# Patient Record
Sex: FEMALE | Race: BLACK OR AFRICAN AMERICAN | NOT HISPANIC OR LATINO | Employment: FULL TIME | URBAN - METROPOLITAN AREA
[De-identification: names, ages, dates, MRNs, and addresses within clinical notes are randomized per-mention and may not be internally consistent; named-entity substitution may affect disease eponyms.]

---

## 2017-05-15 ENCOUNTER — HOSPITAL ENCOUNTER (OUTPATIENT)
Dept: RADIOLOGY | Facility: HOSPITAL | Age: 37
Discharge: HOME | End: 2017-05-15
Attending: INTERNAL MEDICINE
Payer: COMMERCIAL

## 2017-05-15 DIAGNOSIS — M54.50 LOWER BACK PAIN: ICD-10-CM

## 2017-05-15 DIAGNOSIS — M54.50 LOWER BACK PAIN: Primary | ICD-10-CM

## 2017-05-15 PROCEDURE — 72120 X-RAY BEND ONLY L-S SPINE: CPT | Mod: 26,,, | Performed by: RADIOLOGY

## 2017-05-15 PROCEDURE — 72100 X-RAY EXAM L-S SPINE 2/3 VWS: CPT | Mod: TC

## 2017-05-15 PROCEDURE — 72100 X-RAY EXAM L-S SPINE 2/3 VWS: CPT | Mod: 26,,, | Performed by: RADIOLOGY

## 2017-05-16 DIAGNOSIS — M54.50 LOWER BACK PAIN: Primary | ICD-10-CM

## 2017-06-06 ENCOUNTER — CLINICAL SUPPORT (OUTPATIENT)
Dept: REHABILITATION | Facility: HOSPITAL | Age: 37
End: 2017-06-06
Attending: INTERNAL MEDICINE
Payer: COMMERCIAL

## 2017-06-06 DIAGNOSIS — R20.0 NUMBNESS AND TINGLING: ICD-10-CM

## 2017-06-06 DIAGNOSIS — R20.2 NUMBNESS AND TINGLING: ICD-10-CM

## 2017-06-06 PROCEDURE — 97161 PT EVAL LOW COMPLEX 20 MIN: CPT | Mod: PN

## 2017-06-06 NOTE — PLAN OF CARE
TIME RECORD    Date: 06/06/2017    Start Time:  4:18 pm   Stop Time:  4:46 pm     PROCEDURES:    TIMED  Procedure Min.                         UNTIMED  Procedure Min.   Initial Evaluation 28'         Total Timed Minutes:  0  Total Timed Units:  0  Total Untimed Units:  1  Charges Billed/# of units:  1 IE     OUTPATIENT PHYSICAL THERAPY   PATIENT EVALUATION  Onset Date: a couple of months ago  Primary Diagnosis:   1. Numbness and tingling       Treatment Diagnosis: c/o numbness in (L) toe  No past medical history on file.  Precautions: latex allergy, anemia  Prior Therapy: none  Medications: Lawrence Connolly has a current medication list which includes the following prescription(s): levonorgestrel.  Nutrition:  Overweight  History of Present Illness: chronic - a couple of months  Prior Level of Function: Independent  Social History: Pt stated that she works for [employer] where she is predominantly on her feet   Place of Residence (Steps/Adaptations): Pt stated that she has 4 steps to enter her H. Pt stated that she does not have difficulty ascending/descending steps/stairs.   Functional Deficits Leading to Referral/Nature of Injury: pt stated that she has no limitations due to numbness/tingling in (L) great toe  Patient Therapy Goals:     Subjective     Lawrence Connolly states that she has not been experiencing back pain. Pt stated that she has been experiencing off and on numbness/tingling in (L) big toe for a couple of months. Pt denies numbness/tingling anywhere else and denies experiencing any radiating pain into (B) LE. Pt stated that MD told her numbness/tingling in (L) toe is due to a nerve in her back. Pt stated that a few months ago she hit her (L) big toe on washing machine and stated that numbness/tingling may have started after this incident.  Pt stated that she experiences numbness/tingling in (L) big toe randomly when sitting or sleeping. Pt reported that this numbness/tingling  lasts no longer than 20 minutes. Pt stated that she is not aware of a particular cause/trigger of numbness/tingling.  Pt stated that numbness/tingling in (L) big toe does not limit her from doing anything.     Pain:  Location: pt stated that she is not experiencing any pain, just intermittent numbness/tingling into (L) great toe   Description: Numb  Activities Which Increase Pain: pt denies particular cause/trigger of numbness/tingling  Activities Which Decrease Pain: pt stated that numbness/tingling eventually goes away randomly       Objective     Posture: rounded shoulders, forward head, flexed trunk in sitting   Palpation: no c/o tenderness to palpation along lumbar/buttock region  Sensation: (B) LE grossly intact to light touch sensation    Range of Motion/Strength:      Lumbar AROM: Pain/Dysfunction with Movement:   Flexion 60 degrees    Extension 25 degrees    Right side bending 30 degrees    Left side bending 30 degrees    Right rotation 40 degrees    Left rotation 40 degrees      Hip Right  Left  Pain/Dysfunction with Movement    AROM MMT AROM MMT    Flexion WFL 4+/5 WFL 4+/5    Extension WFL 4+/5 WFL 4+/5     Abduction WFL  4+/5 WFL 4+/5       Knee Right  Left  Pain/Dysfunction with Movement    AROM MMT AROM MMT    Flexion WFL 5/5 WFL 5/5    Extension WFL 4+/5 WFL 4+/5      Ankle Right  Left  Pain/Dysfunction with Movement    AROM MMT AROM MMT    Plantarflexion WFL 5/5 WFL 5/5    Dorsiflexion WFL 5/5 WFL 5/5      (L) great toe: flexion and extension WFL        Gait: Without AD  Analysis: no significant gait deviations   Bed Mobility:Independent  Transfers: Independent  Special Tests: (B) SLR test: negative   Other: FOTO lumbar survey: 37% limited - not consistent with pt report of not experiencing any back pain and report of no limitations due to her symptoms  Treatment: Pt does not require skilled PT at this time. Pt verbalized understanding and agreement.     Assessment       Initial Assessment  (Pertinent finding, problem list and factors affecting outcome): Ms. Connolly is a 36 year old female referred to PT for lower back pain. Pt stated that she has not been experiencing any back pain and denied experiencing radiating pain into (B) LE. Pt reported experiencing intermittent numbness/tingling in (L) great toe. Pt denies experiencing any numbness/tingling in the rest of (L) LE and in (R) LE. Pt was able to perform lumbar AROM in all directions without any c/o pain/numbness. Pt presents with no c/o tenderness to palpation in lumbar region. Pt's (B) LE grossly intact to light touch sensation. Pt reported no current limitations due to intermittent numbness/tingling in (L) great toe. Pt does not require skilled PT at this time.     History  Co-morbidities and personal factors that may impact the plan of care Examination  Body Structures and Functions, activity limitations and participation restrictions that may impact the plan of care Clinical Presentation   Decision Making/ Complexity Score   Co-morbidities:       anemia          Personal Factors:     low Body Regions:  trunk, back, LE    Body Systems: musculoskeletal - posture, ROM, strength; neuromuscular - sensation, gait      Activity limitations: pt denies current limitations due to numbness/tingling in (L) great toe      Participation Restrictions:     high   Stable and uncomplicated  FOTO lumbar Survey: 37% limitation    low   low             Plan     Certification Period: 6/6/17 to 6/6/17  Other Recommendations: follow up with MD [facility]      Therapist: Althea Townsend, PT    I CERTIFY THE NEED FOR THESE SERVICES FURNISHED UNDER THIS PLAN OF TREATMENT AND WHILE UNDER MY CARE    Physician's comments: ________________________________________________________________________________________________________________________________________________      Physician's Name: ___________________________________

## 2017-10-19 ENCOUNTER — TELEPHONE (OUTPATIENT)
Dept: OBSTETRICS AND GYNECOLOGY | Facility: CLINIC | Age: 37
End: 2017-10-19

## 2017-10-19 NOTE — TELEPHONE ENCOUNTER
----- Message from Debby Kebede sent at 10/19/2017  1:52 PM CDT -----  Contact: [phone]  Patient is returning your call

## 2017-10-20 ENCOUNTER — TELEPHONE (OUTPATIENT)
Dept: OBSTETRICS AND GYNECOLOGY | Facility: CLINIC | Age: 37
End: 2017-10-20

## 2017-10-20 NOTE — TELEPHONE ENCOUNTER
----- Message from Cristina Lola sent at 10/19/2017  2:03 PM CDT -----  Contact: self, [phone]  Patient called in returning your call. Please advise.

## 2019-02-03 PROBLEM — R07.9 CHEST PAIN: Status: ACTIVE | Noted: 2019-02-03

## 2019-02-03 PROBLEM — I26.99 OTHER PULMONARY EMBOLISM WITHOUT ACUTE COR PULMONALE: Status: ACTIVE | Noted: 2019-02-03

## 2019-02-15 ENCOUNTER — OFFICE VISIT (OUTPATIENT)
Dept: HEMATOLOGY/ONCOLOGY | Facility: CLINIC | Age: 39
End: 2019-02-15
Payer: COMMERCIAL

## 2019-02-15 VITALS
SYSTOLIC BLOOD PRESSURE: 140 MMHG | HEART RATE: 68 BPM | TEMPERATURE: 98 F | WEIGHT: 164.69 LBS | OXYGEN SATURATION: 100 % | BODY MASS INDEX: 30.12 KG/M2 | RESPIRATION RATE: 16 BRPM | DIASTOLIC BLOOD PRESSURE: 98 MMHG

## 2019-02-15 DIAGNOSIS — Z79.01 ON CONTINUOUS ORAL ANTICOAGULATION: ICD-10-CM

## 2019-02-15 DIAGNOSIS — I26.99 OTHER ACUTE PULMONARY EMBOLISM WITHOUT ACUTE COR PULMONALE: Primary | ICD-10-CM

## 2019-02-15 PROCEDURE — 99204 OFFICE O/P NEW MOD 45 MIN: CPT | Mod: S$GLB,,, | Performed by: INTERNAL MEDICINE

## 2019-02-15 PROCEDURE — 99999 PR PBB SHADOW E&M-EST. PATIENT-LVL III: CPT | Mod: PBBFAC,,, | Performed by: INTERNAL MEDICINE

## 2019-02-15 PROCEDURE — 99999 PR PBB SHADOW E&M-EST. PATIENT-LVL III: ICD-10-PCS | Mod: PBBFAC,,, | Performed by: INTERNAL MEDICINE

## 2019-02-15 PROCEDURE — 99204 PR OFFICE/OUTPT VISIT, NEW, LEVL IV, 45-59 MIN: ICD-10-PCS | Mod: S$GLB,,, | Performed by: INTERNAL MEDICINE

## 2019-02-16 NOTE — PROGRESS NOTES
Subjective:       Patient ID: Lawrence Connolly is a 38 y.o. female.    Chief Complaint: pulmonary embolism    HPI She presented to ED on 2/3/19 with chest heaviness and pressure x 1 week, elevated d-dimer noted. CTA chest done 2/3/19 that showed a small right lower lobe subsegmental pulmonary embolism.     She uses Mirena since 4 years. It has been removed.    No prior h/o DVT or PE.    No family h/o DVT/PE.    She is phlebotomist by occupation, works at [email or web address].    Currently on Eliquis 5 mg bid - tolerating it well.    Review of Systems   Constitutional: Negative for fever and unexpected weight change.   HENT: Negative for mouth sores and nosebleeds.    Eyes: Negative for photophobia and pain.   Respiratory: Negative for wheezing and stridor.    Cardiovascular: Negative for chest pain and palpitations.   Gastrointestinal: Negative for abdominal pain and vomiting.   Skin: Negative for rash and wound.   Neurological: Negative for seizures and syncope.   Hematological: Negative for adenopathy. Does not bruise/bleed easily.   Psychiatric/Behavioral: Negative for behavioral problems and confusion.     Objective:      Physical Exam   Constitutional: She is cooperative. She does not appear ill. No distress.   HENT:   Head: Head is without laceration, without right periorbital erythema and without left periorbital erythema.   Nose: No epistaxis.   Mouth/Throat: Oropharynx is clear and moist. No oropharyngeal exudate. No tonsillar exudate.   Eyes: Conjunctivae are normal.   Neck: Neck supple. No thyroid mass and no thyromegaly present.   Cardiovascular: Normal rate and regular rhythm. Exam reveals no friction rub.   Pulmonary/Chest: Breath sounds normal. No accessory muscle usage or stridor. No tachypnea. No respiratory distress. Chest wall is not dull to percussion. She exhibits no tenderness.   Abdominal: Soft. She exhibits no distension, no ascites and no mass. There is no hepatosplenomegaly.   Musculoskeletal: She  exhibits no edema.   Lymphadenopathy:        Head (right side): No submental and no submandibular adenopathy present.        Head (left side): No submental and no submandibular adenopathy present.     She has no cervical adenopathy.     She has no axillary adenopathy.   Neurological: She is alert. She has normal strength. No cranial nerve deficit.   Skin: No bruising, no petechiae and no rash noted. She is not diaphoretic.   Psychiatric: Her behavior is normal. Thought content normal. Cognition and memory are normal. She does not exhibit a depressed mood.   Vitals reviewed.      Assessment:       1. Other acute pulmonary embolism without acute cor pulmonale    2. On continuous oral anticoagulation        Plan:   She has small subsegmental PE - unclear if this is purely provoked by use of Mirena.    Nevertheless IUD has been appropriately removed    Recommend she continue anticoagulation for 3 months    Will check d-dimer and CTA Chest in 3 months.    Will plan to perform hypercoagulable work-up at that time - once her PE resolves and once she gets off Eliquis.

## 2019-04-12 DIAGNOSIS — N64.4 MASTODYNIA: Primary | ICD-10-CM

## 2019-04-17 ENCOUNTER — TELEPHONE (OUTPATIENT)
Dept: RADIOLOGY | Facility: HOSPITAL | Age: 39
End: 2019-04-17

## 2019-04-25 ENCOUNTER — HOSPITAL ENCOUNTER (OUTPATIENT)
Dept: RADIOLOGY | Facility: HOSPITAL | Age: 39
Discharge: HOME | End: 2019-04-25
Attending: INTERNAL MEDICINE
Payer: COMMERCIAL

## 2019-04-25 VITALS — WEIGHT: 164 LBS | HEIGHT: 62 IN | BODY MASS INDEX: 30.18 KG/M2

## 2019-04-25 DIAGNOSIS — N64.4 BREAST PAIN: ICD-10-CM

## 2019-04-25 DIAGNOSIS — N64.4 MASTODYNIA: ICD-10-CM

## 2019-04-25 PROCEDURE — 77062 BREAST TOMOSYNTHESIS BI: CPT | Mod: 26,,, | Performed by: RADIOLOGY

## 2019-04-25 PROCEDURE — 77062 MAMMO DIGITAL DIAGNOSTIC BILAT WITH TOMOSYNTHESIS_CAD: ICD-10-PCS | Mod: 26,,, | Performed by: RADIOLOGY

## 2019-04-25 PROCEDURE — 77066 MAMMO DIGITAL DIAGNOSTIC BILAT WITH TOMOSYNTHESIS_CAD: ICD-10-PCS | Mod: 26,,, | Performed by: RADIOLOGY

## 2019-04-25 PROCEDURE — 76642 ULTRASOUND BREAST LIMITED: CPT | Mod: TC,LT

## 2019-04-25 PROCEDURE — 76642 US BREAST LEFT LIMITED: ICD-10-PCS | Mod: 26,LT,, | Performed by: RADIOLOGY

## 2019-04-25 PROCEDURE — 77066 DX MAMMO INCL CAD BI: CPT | Mod: 26,,, | Performed by: RADIOLOGY

## 2019-04-25 PROCEDURE — 77066 DX MAMMO INCL CAD BI: CPT | Mod: TC

## 2019-04-25 PROCEDURE — 76642 ULTRASOUND BREAST LIMITED: CPT | Mod: 26,LT,, | Performed by: RADIOLOGY

## 2019-05-13 ENCOUNTER — OFFICE VISIT (OUTPATIENT)
Dept: HEMATOLOGY/ONCOLOGY | Facility: CLINIC | Age: 39
End: 2019-05-13
Payer: COMMERCIAL

## 2019-05-13 VITALS
SYSTOLIC BLOOD PRESSURE: 126 MMHG | HEART RATE: 64 BPM | RESPIRATION RATE: 16 BRPM | TEMPERATURE: 98 F | WEIGHT: 164.31 LBS | BODY MASS INDEX: 30.05 KG/M2 | OXYGEN SATURATION: 98 % | DIASTOLIC BLOOD PRESSURE: 78 MMHG

## 2019-05-13 DIAGNOSIS — I26.99 OTHER ACUTE PULMONARY EMBOLISM WITHOUT ACUTE COR PULMONALE: Primary | ICD-10-CM

## 2019-05-13 PROBLEM — R07.9 CHEST PAIN: Status: RESOLVED | Noted: 2019-02-03 | Resolved: 2019-05-13

## 2019-05-13 PROCEDURE — 99214 OFFICE O/P EST MOD 30 MIN: CPT | Mod: S$GLB,,, | Performed by: INTERNAL MEDICINE

## 2019-05-13 PROCEDURE — 99999 PR PBB SHADOW E&M-EST. PATIENT-LVL III: CPT | Mod: PBBFAC,,, | Performed by: INTERNAL MEDICINE

## 2019-05-13 PROCEDURE — 99214 PR OFFICE/OUTPT VISIT, EST, LEVL IV, 30-39 MIN: ICD-10-PCS | Mod: S$GLB,,, | Performed by: INTERNAL MEDICINE

## 2019-05-13 PROCEDURE — 99999 PR PBB SHADOW E&M-EST. PATIENT-LVL III: ICD-10-PCS | Mod: PBBFAC,,, | Performed by: INTERNAL MEDICINE

## 2019-05-13 NOTE — PROGRESS NOTES
Subjective:       Patient ID: Lawrence Connolly is a 38 y.o. female.    Chief Complaint: pulmonary embolism    HPI She presented to ED on 2/3/19 with chest heaviness and pressure x 1 week, elevated d-dimer noted. CTA chest done 2/3/19 that showed a small right lower lobe subsegmental pulmonary embolism.     She uses Mirena since 4 years. It has been removed.    No prior h/o DVT or PE.    No family h/o DVT/PE.    She is phlebotomist by occupation, works at [employer].    Currently on Eliquis 5 mg bid - tolerating it well.    Review of Systems   Constitutional: Negative for fever and unexpected weight change.   HENT: Negative for mouth sores and nosebleeds.    Eyes: Negative for photophobia and pain.   Respiratory: Negative for wheezing and stridor.    Cardiovascular: Negative for chest pain and palpitations.   Gastrointestinal: Negative for abdominal pain and vomiting.   Skin: Negative for rash and wound.   Neurological: Negative for seizures and syncope.   Hematological: Negative for adenopathy. Does not bruise/bleed easily.   Psychiatric/Behavioral: Negative for behavioral problems and confusion.     Objective:      Physical Exam   Constitutional: She is cooperative. She does not appear ill. No distress.   HENT:   Head: Head is without laceration, without right periorbital erythema and without left periorbital erythema.   Nose: No epistaxis.   Mouth/Throat: Oropharynx is clear and moist. No oropharyngeal exudate. No tonsillar exudate.   Eyes: Conjunctivae are normal.   Neck: Neck supple. No thyroid mass and no thyromegaly present.   Cardiovascular: Normal rate and regular rhythm. Exam reveals no friction rub.   Pulmonary/Chest: Breath sounds normal. No accessory muscle usage or stridor. No tachypnea. No respiratory distress. Chest wall is not dull to percussion. She exhibits no tenderness.   Abdominal: Soft. She exhibits no distension, no ascites and no mass. There is no hepatosplenomegaly.   Musculoskeletal: She  exhibits no edema.   Lymphadenopathy:        Head (right side): No submental and no submandibular adenopathy present.        Head (left side): No submental and no submandibular adenopathy present.     She has no cervical adenopathy.     She has no axillary adenopathy.   Neurological: She is alert. She has normal strength. No cranial nerve deficit.   Skin: No bruising, no petechiae and no rash noted. She is not diaphoretic.   Psychiatric: Her behavior is normal. Thought content normal. Cognition and memory are normal. She does not exhibit a depressed mood.   Vitals reviewed.      Assessment:       1. Other acute pulmonary embolism without acute cor pulmonale        Plan:   She has small subsegmental PE - appears to be provoked by use of Mirena.    Recent CT scan shows resolution of PE    Discussed pros and cons of stopping Eliquis - decided to d/c it    Will see her for f/u in 4 weeks - will perform hypercoagulable w/u 2 wks before f/u for completion

## 2019-06-03 ENCOUNTER — APPOINTMENT (OUTPATIENT)
Dept: LAB | Facility: HOSPITAL | Age: 39
End: 2019-06-03
Attending: INTERNAL MEDICINE
Payer: COMMERCIAL

## 2019-06-10 ENCOUNTER — OFFICE VISIT (OUTPATIENT)
Dept: HEMATOLOGY/ONCOLOGY | Facility: CLINIC | Age: 39
End: 2019-06-10
Payer: COMMERCIAL

## 2019-06-10 VITALS
TEMPERATURE: 98 F | HEART RATE: 70 BPM | OXYGEN SATURATION: 99 % | SYSTOLIC BLOOD PRESSURE: 126 MMHG | DIASTOLIC BLOOD PRESSURE: 80 MMHG | RESPIRATION RATE: 16 BRPM | BODY MASS INDEX: 30 KG/M2 | WEIGHT: 164 LBS

## 2019-06-10 DIAGNOSIS — I26.99 OTHER PULMONARY EMBOLISM WITHOUT ACUTE COR PULMONALE, UNSPECIFIED CHRONICITY: Primary | ICD-10-CM

## 2019-06-10 PROCEDURE — 99999 PR PBB SHADOW E&M-EST. PATIENT-LVL III: ICD-10-PCS | Mod: PBBFAC,,, | Performed by: INTERNAL MEDICINE

## 2019-06-10 PROCEDURE — 99999 PR PBB SHADOW E&M-EST. PATIENT-LVL III: CPT | Mod: PBBFAC,,, | Performed by: INTERNAL MEDICINE

## 2019-06-10 PROCEDURE — 99213 PR OFFICE/OUTPT VISIT, EST, LEVL III, 20-29 MIN: ICD-10-PCS | Mod: S$GLB,,, | Performed by: INTERNAL MEDICINE

## 2019-06-10 PROCEDURE — 99213 OFFICE O/P EST LOW 20 MIN: CPT | Mod: S$GLB,,, | Performed by: INTERNAL MEDICINE

## 2019-06-10 NOTE — ASSESSMENT & PLAN NOTE
- hypercoagulable work-up unremarkable  - now doing well off eliquis since 5/13/19  - PE episode likely secondary to use of mirena  - educated her about s/s of recurrent PE    - rtc prn

## 2019-06-10 NOTE — PROGRESS NOTES
PATIENT: Lawrence Connolly  MRN: [id number]  DATE: 6/10/2019    Diagnosis:   1. Other pulmonary embolism without acute cor pulmonale, unspecified chronicity      Chief Complaint: No chief complaint on file.    Subjective:     Pertinent Medical History:     She presented to ED on 2/3/19 with chest heaviness and pressure x 1 week, elevated d-dimer noted.     CTA chest done 2/3/19 that showed a small right lower lobe subsegmental pulmonary embolism.      She uses Mirena since 4 years. It has been removed.     No prior h/o DVT or PE.     No family h/o DVT/PE.     She is phlebotomist by occupation, works at [employer].     Currently on Eliquis 5 mg bid - tolerating it well.    Interval History:   - Stopped Eliquis 5/13/2019  - took it for a little over 3 months  - Underwent hypercoagulable work-up and is here to discuss test results    Past Medical, Surgical, Family, and Social histories reviewed.    Medication and Allergies reviewed.    Review of Systems  CONSTITUTIONAL: Weight stable. Appetite good. No fevers.  RESPIRATORY: No cough or congestion.  CARDIOVASCULAR: No chest pain or difficulty breathing.  GASTROINTESTINAL: No abdominal pain or nausea. No change in bowel habits.    Objective:     Physical Exam  General appearance: no acute distress. conversant  Ear, Nose, Mouth, Throat: oropharynx clear. mucous membranes moist. no oral ulcers. no gum bleeding.  Neck: no masses or enlarged lymph nodes  Lungs: clear to auscultation. no rales. breathing nonlabored  Heart: rhythm regular. no gallops. no edema.  Abdomen: soft and nontender. no ascites. no hepatosplenomegaly.  Neurologic/Psychiatric: alert. oriented to time, place and person. no anxiety or agitation.    Assessment:       1. Other pulmonary embolism without acute cor pulmonale, unspecified chronicity      Plan:   Other pulmonary embolism without acute cor pulmonale  - hypercoagulable work-up unremarkable  - now doing well off eliquis since 5/13/19  - PE episode  likely secondary to use of mirena  - educated her about s/s of recurrent PE    - rtc prn

## 2020-01-20 ENCOUNTER — TELEPHONE (OUTPATIENT)
Dept: HEMATOLOGY/ONCOLOGY | Facility: CLINIC | Age: 40
End: 2020-01-20

## 2020-01-20 NOTE — TELEPHONE ENCOUNTER
Rescheduled appt for 1/27----- Message from Mayte Keli sent at 1/20/2020  9:33 AM CST -----  Contact: Lawrence   tel:    [phone]   Caller says she is loosing a lot of weight and was told to schedule a f/u w/ you.     Scheduled first available appt. W/ you in Feb.        Did you need to see her sooner?

## 2020-01-28 ENCOUNTER — TELEPHONE (OUTPATIENT)
Dept: HEMATOLOGY/ONCOLOGY | Facility: CLINIC | Age: 40
End: 2020-01-28

## 2020-01-28 DIAGNOSIS — I26.99 OTHER PULMONARY EMBOLISM WITHOUT ACUTE COR PULMONALE, UNSPECIFIED CHRONICITY: Primary | ICD-10-CM

## 2020-02-24 ENCOUNTER — OFFICE VISIT (OUTPATIENT)
Dept: HEMATOLOGY/ONCOLOGY | Facility: CLINIC | Age: 40
End: 2020-02-24
Payer: COMMERCIAL

## 2020-02-24 VITALS
BODY MASS INDEX: 27.91 KG/M2 | RESPIRATION RATE: 18 BRPM | OXYGEN SATURATION: 100 % | TEMPERATURE: 98 F | WEIGHT: 152.63 LBS | DIASTOLIC BLOOD PRESSURE: 99 MMHG | HEART RATE: 64 BPM | SYSTOLIC BLOOD PRESSURE: 155 MMHG

## 2020-02-24 DIAGNOSIS — R63.4 WEIGHT LOSS, UNINTENTIONAL: ICD-10-CM

## 2020-02-24 DIAGNOSIS — R10.11 RIGHT UPPER QUADRANT ABDOMINAL PAIN: ICD-10-CM

## 2020-02-24 DIAGNOSIS — E55.9 VITAMIN D DEFICIENCY: Primary | ICD-10-CM

## 2020-02-24 DIAGNOSIS — Z86.711 HISTORY OF PULMONARY EMBOLISM: ICD-10-CM

## 2020-02-24 PROCEDURE — 99999 PR PBB SHADOW E&M-EST. PATIENT-LVL III: ICD-10-PCS | Mod: PBBFAC,,, | Performed by: INTERNAL MEDICINE

## 2020-02-24 PROCEDURE — 99999 PR PBB SHADOW E&M-EST. PATIENT-LVL III: CPT | Mod: PBBFAC,,, | Performed by: INTERNAL MEDICINE

## 2020-02-24 PROCEDURE — 99214 OFFICE O/P EST MOD 30 MIN: CPT | Mod: S$GLB,,, | Performed by: INTERNAL MEDICINE

## 2020-02-24 PROCEDURE — 99214 PR OFFICE/OUTPT VISIT, EST, LEVL IV, 30-39 MIN: ICD-10-PCS | Mod: S$GLB,,, | Performed by: INTERNAL MEDICINE

## 2020-02-24 RX ORDER — CHOLECALCIFEROL (VITAMIN D3) 1250 MCG
50000 TABLET ORAL WEEKLY
Qty: 12 TABLET | Refills: 3 | Status: SHIPPED | OUTPATIENT
Start: 2020-02-24

## 2020-02-24 NOTE — PROGRESS NOTES
PATIENT: Lawrence Connolly  MRN: [id number]  DATE: 2/24/2020    Diagnosis:   1. Vitamin D deficiency    2. Right upper quadrant abdominal pain    3. History of pulmonary embolism    4. Weight loss, unintentional      Chief Complaint: pulmonary embolism and Vitamin D Deficiency    Subjective:     Pertinent Medical History:     She presented to ED on 2/3/19 with chest heaviness and pressure x 1 week, elevated d-dimer noted.     CTA chest done 2/3/19 that showed a small right lower lobe subsegmental pulmonary embolism.      She used Mirena for 4 years, after it was removed.     No prior h/o DVT or PE. No family h/o DVT/PE.     She is phlebotomist by occupation, works at [employer].    Interval History:   -stopped Eliquis 5/13/2019  -took it for a little over 3 months  -hypercoagulable workup negative/unremarkable  -she has been having some unintentional weight loss for the last few months but then she picks up that weight  -complains of some discomfort in the epigastric region, no nausea  -also has been having some fatigue.    Past Medical, Surgical, Family, and Social histories reviewed.    Medication and Allergies reviewed.    Review of Systems  CONSTITUTIONAL: Weight stable. Appetite good. No fevers.  RESPIRATORY: No cough or congestion.  CARDIOVASCULAR: No chest pain or difficulty breathing.  GASTROINTESTINAL: No abdominal pain or nausea. No change in bowel habits.    Objective:     Physical Exam  General appearance: no acute distress. conversant  Ear, Nose, Mouth, Throat: oropharynx clear. mucous membranes moist. no oral ulcers. no gum bleeding.  Neck: no masses or enlarged lymph nodes  Lungs: clear to auscultation. no rales. breathing nonlabored  Heart: rhythm regular. no gallops. no edema.  Abdomen: soft and nontender. no ascites. no hepatosplenomegaly.  Neurologic/Psychiatric: alert. oriented to time, place and person. no anxiety or agitation.    Assessment:       1. Vitamin D deficiency    2. Right upper quadrant  abdominal pain    3. History of pulmonary embolism    4. Weight loss, unintentional      Plan:   Abdominal discomfort associated with weight loss  -liver function tests and CBCs with chemistries unremarkable   -will check abdominal ultrasound given symptoms of abdominal discomfort for complete evaluation  -physical exam does not reveal any findings that are concerning    Vitamin-D deficiency  -very low vitamin-D level noted  -start vitamin D3, 81459 units daily, prescription sent    I will call her with the results of the abdominal ultrasound

## 2021-05-04 ENCOUNTER — PATIENT MESSAGE (OUTPATIENT)
Dept: RESEARCH | Facility: HOSPITAL | Age: 41
End: 2021-05-04

## 2021-07-01 ENCOUNTER — PATIENT MESSAGE (OUTPATIENT)
Dept: ADMINISTRATIVE | Facility: OTHER | Age: 41
End: 2021-07-01

## 2022-03-17 ENCOUNTER — HOSPITAL ENCOUNTER (OUTPATIENT)
Dept: RADIOLOGY | Facility: HOSPITAL | Age: 42
Discharge: HOME | End: 2022-03-17
Attending: INTERNAL MEDICINE
Payer: COMMERCIAL

## 2022-03-17 DIAGNOSIS — Z12.31 ENCOUNTER FOR SCREENING MAMMOGRAM FOR MALIGNANT NEOPLASM OF BREAST: ICD-10-CM

## 2022-03-17 PROCEDURE — 77063 MAMMO DIGITAL SCREENING BILAT WITH TOMO: ICD-10-PCS | Mod: 26,,, | Performed by: RADIOLOGY

## 2022-03-17 PROCEDURE — 77067 MAMMO DIGITAL SCREENING BILAT WITH TOMO: ICD-10-PCS | Mod: 26,,, | Performed by: RADIOLOGY

## 2022-03-17 PROCEDURE — 77067 SCR MAMMO BI INCL CAD: CPT | Mod: TC

## 2022-03-17 PROCEDURE — 77063 BREAST TOMOSYNTHESIS BI: CPT | Mod: TC

## 2022-03-17 PROCEDURE — 77063 BREAST TOMOSYNTHESIS BI: CPT | Mod: 26,,, | Performed by: RADIOLOGY

## 2022-03-17 PROCEDURE — 77067 SCR MAMMO BI INCL CAD: CPT | Mod: 26,,, | Performed by: RADIOLOGY

## 2022-09-22 ENCOUNTER — HOSPITAL ENCOUNTER (OUTPATIENT)
Dept: RADIOLOGY | Facility: HOSPITAL | Age: 42
Discharge: HOME | End: 2022-09-22
Attending: INTERNAL MEDICINE
Payer: COMMERCIAL

## 2022-09-22 DIAGNOSIS — M79.671 RIGHT FOOT PAIN: Primary | ICD-10-CM

## 2022-09-22 DIAGNOSIS — M79.671 RIGHT FOOT PAIN: ICD-10-CM

## 2022-09-22 PROCEDURE — 73630 X-RAY EXAM OF FOOT: CPT | Mod: TC,FY,RT

## 2022-09-22 PROCEDURE — 73630 XR FOOT COMPLETE 3 VIEW RIGHT: ICD-10-PCS | Mod: 26,RT,, | Performed by: RADIOLOGY

## 2022-09-22 PROCEDURE — 73630 X-RAY EXAM OF FOOT: CPT | Mod: 26,RT,, | Performed by: RADIOLOGY

## 2023-04-06 DIAGNOSIS — Z12.31 SCREENING MAMMOGRAM FOR BREAST CANCER: Primary | ICD-10-CM

## 2023-05-15 ENCOUNTER — HOSPITAL ENCOUNTER (OUTPATIENT)
Dept: RADIOLOGY | Facility: HOSPITAL | Age: 43
Discharge: HOME | End: 2023-05-15
Attending: INTERNAL MEDICINE
Payer: COMMERCIAL

## 2023-05-15 DIAGNOSIS — Z12.31 SCREENING MAMMOGRAM FOR BREAST CANCER: ICD-10-CM

## 2023-05-15 PROCEDURE — 77063 MAMMO DIGITAL SCREENING BILAT WITH TOMO: ICD-10-PCS | Mod: 26,,, | Performed by: RADIOLOGY

## 2023-05-15 PROCEDURE — 77067 MAMMO DIGITAL SCREENING BILAT WITH TOMO: ICD-10-PCS | Mod: 26,,, | Performed by: RADIOLOGY

## 2023-05-15 PROCEDURE — 77067 SCR MAMMO BI INCL CAD: CPT | Mod: 26,,, | Performed by: RADIOLOGY

## 2023-05-15 PROCEDURE — 77063 BREAST TOMOSYNTHESIS BI: CPT | Mod: 26,,, | Performed by: RADIOLOGY

## 2023-05-15 PROCEDURE — 77067 SCR MAMMO BI INCL CAD: CPT | Mod: TC

## 2024-03-05 ENCOUNTER — HOSPITAL ENCOUNTER (OUTPATIENT)
Dept: RADIOLOGY | Facility: HOSPITAL | Age: 44
Discharge: HOME | End: 2024-03-05
Attending: INTERNAL MEDICINE
Payer: COMMERCIAL

## 2024-03-05 DIAGNOSIS — N64.4 BREAST PAIN: ICD-10-CM

## 2024-03-05 PROCEDURE — 77062 BREAST TOMOSYNTHESIS BI: CPT | Mod: 26,,, | Performed by: RADIOLOGY

## 2024-03-05 PROCEDURE — 77062 BREAST TOMOSYNTHESIS BI: CPT | Mod: TC

## 2024-03-05 PROCEDURE — 77066 DX MAMMO INCL CAD BI: CPT | Mod: 26,,, | Performed by: RADIOLOGY

## 2024-10-31 ENCOUNTER — OFFICE VISIT (OUTPATIENT)
Dept: URGENT CARE | Facility: CLINIC | Age: 44
End: 2024-10-31
Payer: COMMERCIAL

## 2024-10-31 VITALS
OXYGEN SATURATION: 95 % | RESPIRATION RATE: 17 BRPM | HEART RATE: 60 BPM | SYSTOLIC BLOOD PRESSURE: 135 MMHG | HEIGHT: 62 IN | TEMPERATURE: 98 F | WEIGHT: 175 LBS | DIASTOLIC BLOOD PRESSURE: 97 MMHG | BODY MASS INDEX: 32.2 KG/M2

## 2024-10-31 DIAGNOSIS — Z02.6 ENCOUNTER RELATED TO WORKER'S COMPENSATION CLAIM: Primary | ICD-10-CM

## 2024-10-31 DIAGNOSIS — T75.89XA ENVIRONMENTAL EXPOSURE: ICD-10-CM

## 2024-10-31 RX ORDER — HYDROCORTISONE 25 MG/G
CREAM TOPICAL
COMMUNITY
Start: 2024-08-30

## 2024-10-31 RX ORDER — FLUTICASONE PROPIONATE 50 MCG
1 SPRAY, SUSPENSION (ML) NASAL
COMMUNITY
Start: 2024-07-10

## 2024-10-31 RX ORDER — LOSARTAN POTASSIUM AND HYDROCHLOROTHIAZIDE 12.5; 5 MG/1; MG/1
1 TABLET ORAL
COMMUNITY
Start: 2024-10-15

## 2024-10-31 RX ORDER — METRONIDAZOLE 500 MG/1
TABLET ORAL
COMMUNITY
Start: 2024-08-09

## 2024-10-31 RX ORDER — FLUOCINOLONE ACETONIDE 0.11 MG/ML
OIL AURICULAR (OTIC)
COMMUNITY
Start: 2024-07-10

## 2024-10-31 RX ORDER — ONDANSETRON 8 MG/1
8 TABLET, ORALLY DISINTEGRATING ORAL EVERY 6 HOURS PRN
Qty: 12 TABLET | Refills: 0 | Status: SHIPPED | OUTPATIENT
Start: 2024-10-31 | End: 2024-11-03

## 2024-10-31 RX ORDER — BUPROPION HYDROCHLORIDE 150 MG/1
150 TABLET ORAL EVERY MORNING
COMMUNITY
Start: 2024-09-30

## 2025-04-01 DIAGNOSIS — Z12.31 SCREENING MAMMOGRAM FOR BREAST CANCER: Primary | ICD-10-CM

## 2025-04-15 ENCOUNTER — HOSPITAL ENCOUNTER (OUTPATIENT)
Dept: RADIOLOGY | Facility: HOSPITAL | Age: 45
Discharge: HOME | End: 2025-04-15
Attending: INTERNAL MEDICINE
Payer: COMMERCIAL

## 2025-04-15 DIAGNOSIS — M54.50 LOW BACK PAIN: Primary | ICD-10-CM

## 2025-04-15 DIAGNOSIS — M54.50 LOW BACK PAIN: ICD-10-CM

## 2025-04-15 DIAGNOSIS — M25.552 LEFT HIP PAIN: ICD-10-CM

## 2025-04-15 DIAGNOSIS — Z12.31 SCREENING MAMMOGRAM FOR BREAST CANCER: ICD-10-CM

## 2025-04-15 PROCEDURE — 77067 SCR MAMMO BI INCL CAD: CPT | Mod: TC

## 2025-04-15 PROCEDURE — 77067 SCR MAMMO BI INCL CAD: CPT | Mod: 26,,, | Performed by: RADIOLOGY

## 2025-04-15 PROCEDURE — 72110 X-RAY EXAM L-2 SPINE 4/>VWS: CPT | Mod: 26,,, | Performed by: RADIOLOGY

## 2025-04-15 PROCEDURE — 77063 BREAST TOMOSYNTHESIS BI: CPT | Mod: 26,,, | Performed by: RADIOLOGY

## 2025-04-15 PROCEDURE — 73502 X-RAY EXAM HIP UNI 2-3 VIEWS: CPT | Mod: 26,LT,, | Performed by: RADIOLOGY

## 2025-04-15 PROCEDURE — 72110 X-RAY EXAM L-2 SPINE 4/>VWS: CPT | Mod: TC,FY

## 2025-04-15 PROCEDURE — 73502 X-RAY EXAM HIP UNI 2-3 VIEWS: CPT | Mod: TC,FY,LT

## 2025-09-04 DIAGNOSIS — R79.89 PROLACTIN INCREASED: Primary | ICD-10-CM
